# Patient Record
Sex: FEMALE | Race: WHITE | NOT HISPANIC OR LATINO | URBAN - METROPOLITAN AREA
[De-identification: names, ages, dates, MRNs, and addresses within clinical notes are randomized per-mention and may not be internally consistent; named-entity substitution may affect disease eponyms.]

---
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
## 2025-03-27 NOTE — PROGRESS NOTES
No chief complaint on file.      Abdominal Pain  This is a chronic problem. The current episode started in the past 7 days. The onset quality is gradual. The problem occurs daily. The problem has been gradually improving. The pain is located in the generalized abdominal region. The pain is at a severity of 4/10. The pain is mild. The quality of the pain is cramping. Associated symptoms include anorexia, constipation, diarrhea, headaches, nausea and vomiting. Pertinent negatives include no dysuria, fever, frequency or hematuria. Relieved by: improved with ABX and Zofran. She has tried antibiotics for the symptoms. The treatment provided mild relief. Prior diagnostic workup includes GI consult, CT scan and ultrasound. There is no history of abdominal surgery or GERD.   Syncope  Associated symptoms include abdominal pain, chest pain, dizziness, headaches, nausea and vomiting. Pertinent negatives include no fever or palpitations.       Review of Systems   Constitutional: Negative.  Negative for fever.   HENT: Negative for sore throat.    Respiratory: Negative for cough and wheezing.    Cardiovascular: Positive for chest pain and syncope. Negative for palpitations.   Gastrointestinal: Positive for nausea, vomiting, abdominal pain, diarrhea, constipation and anorexia.   Genitourinary: Negative for dysuria, urgency, frequency, hematuria and flank pain.   Skin: Negative.    Neurological: Positive for dizziness, tremors, loss of consciousness and headaches.   Psychiatric/Behavioral: Negative for suicidal ideas. The patient is nervous/anxious. The patient does not have insomnia.        Brielle is a 16-year-old pleasant adolescent who is in the office today with her sister who is her legal guardian.       Patient has multiple concerns this morning but the primary reason for her visit today is for a follow-up for abdominal pain and F/U for ED visit yesterday.  ED report reviewed.  She reported to the ED with abdominal pain and  vomiting. She had tenderness in the epigastric left mid lower abdomen. Her initial white count was elevated and she was given IV fluids and IV antiemetics and pain medications. She continued to have pain and an ultrasound was obtained to rule out ovarian torsion and any masses. Ultrasound James normal. She continued with pain and nausea and vomiting and therefore a CAT scan was done and was basically normal except for constipation. Her appendix was normal. Her UA showed WBC elevation well as some RBCs. She had a moderate amount of bacteria and the urine is pending culture and sensitivity. She was treated for a UTI given an injection of Rocephin and sent home on Omnicef and Zofran. Today she reports for follow-up and states she feels slightly better but still nauseous which is a chronic problem with generalized abdominal pain.  She has had chronic abdominal pain and prior to moving from [place] to live with her sister, it was recommended that she have a GI referral for workup. She states that she was vomiting almost daily and has chronic cramping abdominal pain as well as nausea on most days. She does admit that she is under a lot of stress and many of her medical specialty appointments were not followed up on due to parental neglect. Her appetite is fairly poor and she eats mostly breads. She was given a prescription for MiraLAX for the constipation and states that she has had diarrhea since starting the MiraLAX.     Brielle and her sister are concerned about her frequent fainting spells.  The past year she reports that she has had 5 fainting spells. 2 of the fainting spells were witnessed by her boyfriend had abnormal arm movements as well as jaw clenching. Reported to appear to be seizure-like activity but no follow-up medical attention sought. She reports to having 3 fainting spells in the shower where she lost complete consciousness and was found by her boyfriend as well. She remembers feeling lightheaded and  then blacked out. Unclear of how long the episodes lasted at that time. She has never had a cardiology evaluation. Does complain of   upper right chest pain that occurs chronically. She has had a prescription for albuterol in the past.    She also complains of chronic headaches and lots of stress-induced anxiety with frequent panic attacks. She is very anxious about starting school. Today is her first data new school since she moved from [place] and very tearful in office about transition to new school and her health status. She had a boyfriend in [place] and he was her only partner and admits to being sexually active. Pregnancy test yesterday was negative and she is requesting a Depo injection today.     ROS:    All other systems reviewed and are negative, except as in HPI.     There are no active problems to display for this patient.      Current Outpatient Prescriptions   Medication Sig Dispense Refill   • bismuth subsalicylate (PEPTO-BISMOL) 262 MG/15ML Suspension Take 30 mL by mouth every 6 hours as needed.     • ondansetron (ZOFRAN ODT) 4 MG TABLET DISPERSIBLE Take 1 Tab by mouth every 8 hours as needed for Nausea/Vomiting. 10 Tab 0   • cefdinir (OMNICEF) 300 MG Cap Take 1 Cap by mouth every 12 hours for 7 days. 1 Quantity Sufficient 0     Current Facility-Administered Medications   Medication Dose Route Frequency Provider Last Rate Last Dose   • medroxyPROGESTERone (DEPO-PROVERA) injection 150 mg  150 mg Intramuscular Once NEEL [email or web address]            Review of patient's allergies indicates no known allergies.    Past Medical History   Diagnosis Date   • Anxiety    • Asthma    • Headache    • History of fainting spells of unknown cause        Family History   Problem Relation Age of Onset   • Cancer Mother      ovarian   • Diabetes Father        Social History     Social History   • Marital Status: Single     Spouse Name: N/A   • Number of Children: N/A   • Years of Education: N/A  "    Occupational History   • Not on file.     Social History Main Topics   • Smoking status: Never Smoker    • Smokeless tobacco: Not on file   • Alcohol Use: No   • Drug Use: No   • Sexual Activity:     Partners: Male     Birth Control/ Protection: Injection     Other Topics Concern   • Suicidal Thoughts No   • Speech Difficulties Yes   • Inadequate Sleep No     Social History Narrative         PHYSICAL EXAM    /62 mmHg  Pulse 72  Temp(Src) 36.3 °C (97.3 °F)  Resp 19  Ht 1.664 m (5' 5.51\")  Wt 53.071 kg (117 lb)  BMI 19.17 kg/m2    Constitutional:Alert, active. No distress.   HEENT: Pupils equal, round and reactive to light, Conjunctivae and EOM are normal. Right TM normal. Left TM normal. Oropharynx moist with no erythema or exudate.   Neck:       Supple, Normal range of motion  Lymphatic:  No cervical or supraclavicular lymphadenopathy  Lungs:     Effort normal. Clear to auscultation bilaterally, no wheezes/rales/rhonchi  CV:          Regular rate and rhythm. Normal S1/S2.  No murmurs.  Intact distal pulses.  Abd:        Soft,  generalized tenderness in left lower quadrant, non distended. Normal active bowel sounds.  No rebound or guarding.  No hepatosplenomegaly.  Ext:         Well perfused, no clubbing/cyanosis/edema. Moving all extremities well.   Skin:       No rashes or bruising.  Neurologic: Active    ASSESSMENT & PLAN    1. UTI (urinary tract infection), bacterial  -Symptoms improved on Omnicef.  -Continue taking antibiotic till course complete.  -sister to call office for final culture and sensitivity results in 3-4 days.  -Discussed UTI prevention - ensure proper and full elimination of bladder and stool; no bubble baths; wipe front to back; do not hold urine or stool; drink plenty of water.    2. Vasovagal syncope  - REFERRAL TO PEDIATRIC CARDIOLOGY  - REFERRAL TO PEDIATRIC NEUROLOGY    3. Panic attacks  - REFERRAL TO PEDIATRIC PSYCHOLOGY    4. Abdominal pain, chronic, generalized  - " REFERRAL TO PEDIATRIC GASTROENTEROLOGY-  -advised to try a dairy free gluten-free diet for 2 weeks and then gradually introduce one food type at a time.  -Advised to take a multi-species probiotic.    5. Depot contraception  - Pregnancy test negative and ER yesterday.  - medroxyPROGESTERone (DEPO-PROVERA) injection 150 mg; 1 mL by Intramuscular route once  -Handout given on Depo contraception and will return in 90 days for repeat injection.    6. Abnormal involuntary movements  -Rule out possible seizure activity  - REFERRAL TO PEDIATRIC NEUROLOGY    7. Family disruption  - REFERRAL TO PEDIATRIC PSYCHOLOGY    8. Other constipation  - Discussed titrating MiraLAX until stool is soft but not diarrhea.  -Constipation - Encourage regular fruits and vegetables. Increase water intake. Increase fiber - may want to add fiber gummy daily. Toilet time 5 min twice daily after meals. Discussed daily Miralax to titrate to effect.   - REFERRAL TO PEDIATRIC GASTROENTEROLOGY    9. Chest pain, unspecified type  - REFERRAL TO PEDIATRIC CARDIOLOGY      Patient/Caregiver verbalized understanding and agrees with the plan of care.      Yes...